# Patient Record
Sex: MALE | Race: BLACK OR AFRICAN AMERICAN | NOT HISPANIC OR LATINO | Employment: UNEMPLOYED | URBAN - METROPOLITAN AREA
[De-identification: names, ages, dates, MRNs, and addresses within clinical notes are randomized per-mention and may not be internally consistent; named-entity substitution may affect disease eponyms.]

---
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
## 2025-05-06 ENCOUNTER — OFFICE VISIT (OUTPATIENT)
Dept: PEDIATRICS | Facility: CLINIC | Age: 20
End: 2025-05-06
Payer: COMMERCIAL

## 2025-05-06 VITALS
OXYGEN SATURATION: 99 % | DIASTOLIC BLOOD PRESSURE: 71 MMHG | WEIGHT: 121.7 LBS | HEART RATE: 75 BPM | BODY MASS INDEX: 18.15 KG/M2 | SYSTOLIC BLOOD PRESSURE: 113 MMHG | TEMPERATURE: 97.3 F

## 2025-05-06 DIAGNOSIS — H69.92 DYSFUNCTION OF LEFT EUSTACHIAN TUBE: Primary | ICD-10-CM

## 2025-05-06 DIAGNOSIS — Z11.3 SCREEN FOR STD (SEXUALLY TRANSMITTED DISEASE): ICD-10-CM

## 2025-05-06 DIAGNOSIS — J30.2 SEASONAL ALLERGIC RHINITIS, UNSPECIFIED TRIGGER: ICD-10-CM

## 2025-05-06 LAB
C TRACH DNA SPEC QL PROBE+SIG AMP: NEGATIVE
N GONORRHOEA DNA SPEC QL NAA+PROBE: NEGATIVE
SPECIMEN TYPE: NORMAL

## 2025-05-06 PROCEDURE — 3074F SYST BP LT 130 MM HG: CPT | Performed by: PEDIATRICS

## 2025-05-06 PROCEDURE — 99213 OFFICE O/P EST LOW 20 MIN: CPT | Performed by: PEDIATRICS

## 2025-05-06 PROCEDURE — 3078F DIAST BP <80 MM HG: CPT | Performed by: PEDIATRICS

## 2025-05-06 PROCEDURE — G2211 COMPLEX E/M VISIT ADD ON: HCPCS | Performed by: PEDIATRICS

## 2025-05-06 PROCEDURE — 87491 CHLMYD TRACH DNA AMP PROBE: CPT | Performed by: PEDIATRICS

## 2025-05-06 PROCEDURE — 87591 N.GONORRHOEAE DNA AMP PROB: CPT | Performed by: PEDIATRICS

## 2025-05-06 RX ORDER — FLUTICASONE PROPIONATE 50 MCG
1 SPRAY, SUSPENSION (ML) NASAL DAILY
Qty: 16 G | Refills: 4 | Status: SHIPPED | OUTPATIENT
Start: 2025-05-06

## 2025-05-06 RX ORDER — CETIRIZINE HYDROCHLORIDE 10 MG/1
10 TABLET ORAL DAILY
Qty: 90 TABLET | Refills: 3 | Status: SHIPPED | OUTPATIENT
Start: 2025-05-06

## 2025-05-06 NOTE — PROGRESS NOTES
Assessment & Plan       ICD-10-CM    1. Dysfunction of left eustachian tube  H69.92 fluticasone (FLONASE) 50 MCG/ACT nasal spray     cetirizine (ZYRTEC) 10 MG tablet      2. Screen for STD (sexually transmitted disease)  Z11.3      Chlamydia trachomatis/Neisseria gonorrhoeae by PCR - Clinic Collect      3. Seasonal allergic rhinitis, unspecified trigger  J30.2 fluticasone (FLONASE) 50 MCG/ACT nasal spray   cetirizine (ZYRTEC) 10 MG tablet           The longitudinal plan of care for the diagnosis(es)/condition(s) as documented were addressed during this visit. Due to the added complexity in care, I will continue to support Radhames in the subsequent management and with ongoing continuity of care.    Mat Ricci is a 20 year old, presenting for the following health issues:  Otalgia      5/6/2025    11:02 AM   Additional Questions   Roomed by Viktoriya STONE CMA   Accompanied by self     History of Present Illness       Reason for visit:  My left ear is clogged   He is taking medications regularly.      Clogged since got off the plane  No headphones  No fevers  Hx of seasonal allergies but ran out of meds      Review of Systems  Constitutional, HEENT, cardiovascular, pulmonary, gi and gu systems are negative, except as otherwise noted.      Objective    /71   Pulse 75   Temp 97.3  F (36.3  C) (Tympanic)   Wt 121 lb 11.2 oz (55.2 kg)   SpO2 99%   BMI 18.15 kg/m    Body mass index is 18.15 kg/m .  Physical Exam   General appearance: tired, cooperative and no distress  Ears: R TM - normal: no effusions, no erythema, and normal landmarks, L TM - slightly thickened , clear effusions, no erythema, and normal landmarks  Nose: clear rhinorrhea, mucosa edematous  Oropharynx: mild posterior erythema  Neck: normal, supple and mild shotty adenopathy  Lungs: normal and clear to auscultation  Heart: regular rate and rhythm and no murmurs, clicks, or gallops  Abd: soft, NT/ND + BS no HSM no masses palpated  Skin: no  dileep    GC testing pending (urine)    Signed Electronically by: Galina Lowry MD

## 2025-05-07 ENCOUNTER — RESULTS FOLLOW-UP (OUTPATIENT)
Dept: PEDIATRICS | Facility: CLINIC | Age: 20
End: 2025-05-07
Payer: COMMERCIAL

## 2025-05-07 NOTE — RESULT ENCOUNTER NOTE
Gonorrhea/Chlamydia testing negative/normal- please contact patient on his cell the [phone] number  Galina Lowry MD on 5/7/2025 at 12:01 PM

## 2025-05-07 NOTE — TELEPHONE ENCOUNTER
Pt was informed of message below. He denies further questions.     Galina Carrasco MD to [facility]    5/7/25 12:01 PM  Note      Gonorrhea/Chlamydia testing negative/normal- please contact patient on his cell the [phone] number  Galina JIMENEZ. Rivera Lowry MD on 5/7/2025 at 12:01 PM